# Patient Record
Sex: FEMALE | Race: OTHER | HISPANIC OR LATINO | Employment: FULL TIME | URBAN - METROPOLITAN AREA
[De-identification: names, ages, dates, MRNs, and addresses within clinical notes are randomized per-mention and may not be internally consistent; named-entity substitution may affect disease eponyms.]

---

## 2019-10-24 PROBLEM — M72.2 PLANTAR FASCIITIS: Status: ACTIVE | Noted: 2019-10-24

## 2019-10-24 PROBLEM — R07.9 CHEST PAIN OF UNCERTAIN ETIOLOGY: Status: ACTIVE | Noted: 2019-10-24

## 2019-10-24 PROBLEM — R76.8 CYCLIC CITRULLINATED PEPTIDE (CCP) ANTIBODY POSITIVE: Status: ACTIVE | Noted: 2019-10-24

## 2020-08-31 ENCOUNTER — HOSPITAL ENCOUNTER (OUTPATIENT)
Dept: LAB | Facility: MEDICAL CENTER | Age: 35
End: 2020-08-31
Attending: PHYSICIAN ASSISTANT
Payer: COMMERCIAL

## 2020-08-31 PROCEDURE — 87624 HPV HI-RISK TYP POOLED RSLT: CPT

## 2020-08-31 PROCEDURE — 88175 CYTOPATH C/V AUTO FLUID REDO: CPT

## 2020-09-01 LAB
CYTOLOGY REG CYTOL: NORMAL
HPV HR 12 DNA CVX QL NAA+PROBE: NEGATIVE
HPV16 DNA SPEC QL NAA+PROBE: NEGATIVE
HPV18 DNA SPEC QL NAA+PROBE: NEGATIVE
SPECIMEN SOURCE: NORMAL

## 2020-11-18 ENCOUNTER — HOSPITAL ENCOUNTER (OUTPATIENT)
Dept: RADIOLOGY | Facility: MEDICAL CENTER | Age: 35
End: 2020-11-18
Attending: PHYSICIAN ASSISTANT
Payer: COMMERCIAL

## 2020-11-18 ENCOUNTER — HOSPITAL ENCOUNTER (OUTPATIENT)
Dept: RADIOLOGY | Facility: MEDICAL CENTER | Age: 35
End: 2020-11-18
Payer: COMMERCIAL

## 2020-11-18 DIAGNOSIS — N63.15 BREAST LUMP ON RIGHT SIDE AT 3 O'CLOCK POSITION: ICD-10-CM

## 2020-11-18 DIAGNOSIS — N64.4 BREAST PAIN: ICD-10-CM

## 2020-11-18 PROCEDURE — 76642 ULTRASOUND BREAST LIMITED: CPT | Mod: RT

## 2020-11-18 PROCEDURE — G0279 TOMOSYNTHESIS, MAMMO: HCPCS

## 2020-11-20 ENCOUNTER — HOSPITAL ENCOUNTER (OUTPATIENT)
Dept: RADIOLOGY | Facility: MEDICAL CENTER | Age: 35
End: 2020-11-20
Attending: PHYSICIAN ASSISTANT
Payer: COMMERCIAL

## 2020-11-20 DIAGNOSIS — R92.8 ABNORMAL FINDING ON BREAST IMAGING: ICD-10-CM

## 2020-11-20 PROCEDURE — 19083 BX BREAST 1ST LESION US IMAG: CPT | Mod: RT

## 2020-11-20 PROCEDURE — 38505 NEEDLE BIOPSY LYMPH NODES: CPT

## 2020-11-20 PROCEDURE — 19286 PERQ DEV BREAST ADD US IMAG: CPT

## 2020-11-20 PROCEDURE — 10035 PLMT SFT TISS LOCLZJ DEV 1ST: CPT

## 2020-11-20 PROCEDURE — 88312 SPECIAL STAINS GROUP 1: CPT | Mod: 59

## 2020-11-20 PROCEDURE — A4648 IMPLANTABLE TISSUE MARKER: HCPCS | Mod: RT

## 2020-11-20 PROCEDURE — 88305 TISSUE EXAM BY PATHOLOGIST: CPT | Mod: 59

## 2020-11-20 PROCEDURE — 19285 PERQ DEV BREAST 1ST US IMAG: CPT | Mod: RT

## 2020-11-21 LAB — PATHOLOGY CONSULT NOTE: NORMAL

## 2020-11-23 ENCOUNTER — TELEPHONE (OUTPATIENT)
Dept: RADIOLOGY | Facility: MEDICAL CENTER | Age: 35
End: 2020-11-23

## 2021-03-11 ENCOUNTER — HOSPITAL ENCOUNTER (OUTPATIENT)
Dept: RADIOLOGY | Facility: MEDICAL CENTER | Age: 36
End: 2021-03-11
Attending: INTERNAL MEDICINE
Payer: COMMERCIAL

## 2021-03-11 ENCOUNTER — HOSPITAL ENCOUNTER (OUTPATIENT)
Dept: LAB | Facility: MEDICAL CENTER | Age: 36
End: 2021-03-11
Attending: INTERNAL MEDICINE
Payer: COMMERCIAL

## 2021-03-11 DIAGNOSIS — N61.21 GRANULOMATOUS MASTITIS OF RIGHT BREAST: ICD-10-CM

## 2021-03-11 DIAGNOSIS — R76.8 PR3 ANCA ANTIBODIES PRESENT: ICD-10-CM

## 2021-03-11 DIAGNOSIS — N61.20 GRANULOMATOUS MASTITIS, UNSPECIFIED LATERALITY: ICD-10-CM

## 2021-03-11 DIAGNOSIS — M05.9 SEROPOSITIVE RHEUMATOID ARTHRITIS (HCC): ICD-10-CM

## 2021-03-11 DIAGNOSIS — Z79.899 LONG-TERM USE OF PLAQUENIL: ICD-10-CM

## 2021-03-11 LAB
ALBUMIN SERPL BCP-MCNC: 4 G/DL (ref 3.2–4.9)
ALBUMIN/GLOB SERPL: 1.3 G/DL
ALP SERPL-CCNC: 88 U/L (ref 30–99)
ALT SERPL-CCNC: 24 U/L (ref 2–50)
ANION GAP SERPL CALC-SCNC: 11 MMOL/L (ref 7–16)
APPEARANCE UR: CLEAR
AST SERPL-CCNC: 23 U/L (ref 12–45)
BASOPHILS # BLD AUTO: 0.6 % (ref 0–1.8)
BASOPHILS # BLD: 0.04 K/UL (ref 0–0.12)
BILIRUB SERPL-MCNC: 0.3 MG/DL (ref 0.1–1.5)
BILIRUB UR QL STRIP.AUTO: NEGATIVE
BUN SERPL-MCNC: 11 MG/DL (ref 8–22)
CALCIUM SERPL-MCNC: 9.4 MG/DL (ref 8.5–10.5)
CHLORIDE SERPL-SCNC: 102 MMOL/L (ref 96–112)
CO2 SERPL-SCNC: 23 MMOL/L (ref 20–33)
COLOR UR: YELLOW
CREAT SERPL-MCNC: 0.52 MG/DL (ref 0.5–1.4)
CREAT UR-MCNC: 136.66 MG/DL
CRP SERPL HS-MCNC: 0.29 MG/DL (ref 0–0.75)
EOSINOPHIL # BLD AUTO: 0.22 K/UL (ref 0–0.51)
EOSINOPHIL NFR BLD: 3.1 % (ref 0–6.9)
ERYTHROCYTE [DISTWIDTH] IN BLOOD BY AUTOMATED COUNT: 39.7 FL (ref 35.9–50)
ERYTHROCYTE [SEDIMENTATION RATE] IN BLOOD BY WESTERGREN METHOD: 7 MM/HOUR (ref 0–20)
GLOBULIN SER CALC-MCNC: 3.2 G/DL (ref 1.9–3.5)
GLUCOSE SERPL-MCNC: 75 MG/DL (ref 65–99)
GLUCOSE UR STRIP.AUTO-MCNC: NEGATIVE MG/DL
HCT VFR BLD AUTO: 38.9 % (ref 37–47)
HGB BLD-MCNC: 11.9 G/DL (ref 12–16)
IMM GRANULOCYTES # BLD AUTO: 0.03 K/UL (ref 0–0.11)
IMM GRANULOCYTES NFR BLD AUTO: 0.4 % (ref 0–0.9)
KETONES UR STRIP.AUTO-MCNC: NEGATIVE MG/DL
LEUKOCYTE ESTERASE UR QL STRIP.AUTO: NEGATIVE
LYMPHOCYTES # BLD AUTO: 2.19 K/UL (ref 1–4.8)
LYMPHOCYTES NFR BLD: 30.8 % (ref 22–41)
MCH RBC QN AUTO: 24.4 PG (ref 27–33)
MCHC RBC AUTO-ENTMCNC: 30.6 G/DL (ref 33.6–35)
MCV RBC AUTO: 79.7 FL (ref 81.4–97.8)
MICRO URNS: NORMAL
MONOCYTES # BLD AUTO: 0.78 K/UL (ref 0–0.85)
MONOCYTES NFR BLD AUTO: 11 % (ref 0–13.4)
NEUTROPHILS # BLD AUTO: 3.85 K/UL (ref 2–7.15)
NEUTROPHILS NFR BLD: 54.1 % (ref 44–72)
NITRITE UR QL STRIP.AUTO: NEGATIVE
NRBC # BLD AUTO: 0 K/UL
NRBC BLD-RTO: 0 /100 WBC
PH UR STRIP.AUTO: 6.5 [PH] (ref 5–8)
PLATELET # BLD AUTO: 203 K/UL (ref 164–446)
PMV BLD AUTO: 13.6 FL (ref 9–12.9)
POTASSIUM SERPL-SCNC: 4.2 MMOL/L (ref 3.6–5.5)
PROT SERPL-MCNC: 7.2 G/DL (ref 6–8.2)
PROT UR QL STRIP: NEGATIVE MG/DL
PROT UR-MCNC: 8 MG/DL (ref 0–15)
PROT/CREAT UR: 59 MG/G (ref 10–107)
RBC # BLD AUTO: 4.88 M/UL (ref 4.2–5.4)
RBC UR QL AUTO: NEGATIVE
SODIUM SERPL-SCNC: 136 MMOL/L (ref 135–145)
SP GR UR STRIP.AUTO: 1.02
UROBILINOGEN UR STRIP.AUTO-MCNC: 0.2 MG/DL
WBC # BLD AUTO: 7.1 K/UL (ref 4.8–10.8)

## 2021-03-11 PROCEDURE — 86140 C-REACTIVE PROTEIN: CPT

## 2021-03-11 PROCEDURE — 81003 URINALYSIS AUTO W/O SCOPE: CPT

## 2021-03-11 PROCEDURE — 36415 COLL VENOUS BLD VENIPUNCTURE: CPT

## 2021-03-11 PROCEDURE — 70220 X-RAY EXAM OF SINUSES: CPT

## 2021-03-11 PROCEDURE — 85025 COMPLETE CBC W/AUTO DIFF WBC: CPT

## 2021-03-11 PROCEDURE — 80053 COMPREHEN METABOLIC PANEL: CPT

## 2021-03-11 PROCEDURE — 84156 ASSAY OF PROTEIN URINE: CPT

## 2021-03-11 PROCEDURE — 85652 RBC SED RATE AUTOMATED: CPT

## 2021-03-11 PROCEDURE — 71046 X-RAY EXAM CHEST 2 VIEWS: CPT

## 2021-03-11 PROCEDURE — 86255 FLUORESCENT ANTIBODY SCREEN: CPT

## 2021-03-11 PROCEDURE — 82570 ASSAY OF URINE CREATININE: CPT

## 2021-03-15 LAB — ANCA IGG TITR SER IF: NORMAL {TITER}

## 2021-11-17 ENCOUNTER — OFFICE VISIT (OUTPATIENT)
Dept: MEDICAL GROUP | Facility: CLINIC | Age: 36
End: 2021-11-17
Payer: COMMERCIAL

## 2021-11-17 VITALS
SYSTOLIC BLOOD PRESSURE: 121 MMHG | DIASTOLIC BLOOD PRESSURE: 64 MMHG | RESPIRATION RATE: 16 BRPM | WEIGHT: 210.4 LBS | OXYGEN SATURATION: 97 % | TEMPERATURE: 97.9 F | HEIGHT: 62 IN | BODY MASS INDEX: 38.72 KG/M2 | HEART RATE: 90 BPM

## 2021-11-17 DIAGNOSIS — Z00.00 ENCOUNTER FOR PREVENTATIVE ADULT HEALTH CARE EXAMINATION: ICD-10-CM

## 2021-11-17 PROCEDURE — 99395 PREV VISIT EST AGE 18-39: CPT | Performed by: FAMILY MEDICINE

## 2021-11-17 RX ORDER — M-VIT,TX,IRON,MINS/CALC/FOLIC 27MG-0.4MG
1 TABLET ORAL DAILY
COMMUNITY
End: 2021-12-01

## 2021-11-17 ASSESSMENT — PATIENT HEALTH QUESTIONNAIRE - PHQ9: CLINICAL INTERPRETATION OF PHQ2 SCORE: 0

## 2021-11-17 ASSESSMENT — FIBROSIS 4 INDEX: FIB4 SCORE: 0.86

## 2021-11-18 PROBLEM — Z00.00 ENCOUNTER FOR PREVENTATIVE ADULT HEALTH CARE EXAMINATION: Status: ACTIVE | Noted: 2021-11-18

## 2021-11-18 NOTE — PROGRESS NOTES
CC:There were no encounter diagnoses.      HISTORY OF PRESENT ILLNESS: Patient is a 36 y.o. female established patient who presents today for annual visit. No acute concerns.     Health Maintenance: Completed  Pap 2019 normal.       Encounter for preventative adult health care examination  UTD on routine preventive care. Declines covid vaccination--discussed strong recommendation for it ad questions answered.     BMI 38.0-38.9,adult  Discussed at some length. Pt would like to lose weight. We discussed TLC including swapping processed foods for vegetables and walking most every day. She will follow up if she would like more assistance with weight loss; might be a good candidate for ozempic.      Patient Active Problem List    Diagnosis Date Noted   • Cyclic citrullinated peptide (CCP) antibody positive 10/24/2019   • Chest pain of uncertain etiology 10/24/2019   • Plantar fasciitis 10/24/2019      Allergies:Penicillins    Current Outpatient Medications   Medication Sig Dispense Refill   • hydroxychloroquine (PLAQUENIL) 200 MG Tab Take 2 Tablets by mouth every day. 28 tablet 0   • Cholecalciferol (D-1000) 1000 UNIT Tab D-1000 1000 UNIT TABS     • naproxen (NAPROSYN) 500 MG Tab Take 1 Tab by mouth 2 times a day, with meals. Take with food, avoid use with other NSAIDs (Patient not taking: Reported on 11/17/2021) 180 Tab 1     No current facility-administered medications for this visit.       Social History     Tobacco Use   • Smoking status: Never Smoker   • Smokeless tobacco: Never Used   Vaping Use   • Vaping Use: Never used   Substance Use Topics   • Alcohol use: Yes     Comment: rarely   • Drug use: Never     Social History     Social History Narrative   • Not on file       Family History   Problem Relation Age of Onset   • Lupus Mother    • Thyroid Father        Exam:    /64 (BP Location: Left arm, Patient Position: Sitting, BP Cuff Size: Adult)   Pulse 90   Temp 36.6 °C (97.9 °F) (Temporal)   Resp 16    "Ht 1.575 m (5' 2\")   Wt 95.4 kg (210 lb 6.4 oz)   SpO2 97%  Body mass index is 38.48 kg/m².    General:  Well nourished, well developed female in NAD  HENT: Atraumatic, normocephalic  EYES: Extraocular movements intact, pupils equal and reactive to light  NECK: Supple, FROM  CHEST: No deformities, Equal chest expansion  RESP: Unlabored, no stridor or audible wheeze  ABD: Non-Distended  Extremities: No Clubbing, Cyanosis, or Edema  Skin: Warm/dry, without rashes  Neuro: A/O x 4, CN 2-12 Grossly intact, Motor/sensory grossly intact  Psych: Normal behavior, normal affect      Return in about 3 months (around 2/17/2022).    My total time spent caring for the patient on the day of the encounter was 30 minutes.   This does not include time spent on separately billable procedures/tests.     Please note that this dictation was created using voice recognition software. I have worked with consultants from the vendor as well as technical experts from [facility] to optimize the interface. I have made every reasonable attempt to correct obvious errors, but I expect that there are errors of grammar and possibly content that I did not discover before finalizing the note.  "

## 2021-11-18 NOTE — ASSESSMENT & PLAN NOTE
Discussed at some length. Pt would like to lose weight. We discussed TLC including swapping processed foods for vegetables and walking most every day. She will follow up if she would like more assistance with weight loss; might be a good candidate for ozempic.

## 2021-11-18 NOTE — ASSESSMENT & PLAN NOTE
UTD on routine preventive care. Declines covid vaccination--discussed strong recommendation for it ad questions answered.

## 2022-11-22 ENCOUNTER — OFFICE VISIT (OUTPATIENT)
Dept: MEDICAL GROUP | Facility: CLINIC | Age: 37
End: 2022-11-22
Payer: COMMERCIAL

## 2022-11-22 VITALS
RESPIRATION RATE: 17 BRPM | BODY MASS INDEX: 39.56 KG/M2 | HEART RATE: 76 BPM | SYSTOLIC BLOOD PRESSURE: 119 MMHG | OXYGEN SATURATION: 98 % | HEIGHT: 62 IN | DIASTOLIC BLOOD PRESSURE: 80 MMHG | WEIGHT: 215 LBS

## 2022-11-22 DIAGNOSIS — Z00.00 ENCOUNTER FOR PREVENTATIVE ADULT HEALTH CARE EXAMINATION: ICD-10-CM

## 2022-11-22 DIAGNOSIS — Z11.59 NEED FOR HEPATITIS C SCREENING TEST: ICD-10-CM

## 2022-11-22 DIAGNOSIS — E66.9 OBESITY (BMI 30-39.9): ICD-10-CM

## 2022-11-22 DIAGNOSIS — Z31.69 ENCOUNTER FOR PRECONCEPTION CONSULTATION: ICD-10-CM

## 2022-11-22 PROBLEM — M06.9 RHEUMATOID ARTHRITIS (HCC): Status: ACTIVE | Noted: 2020-08-25

## 2022-11-22 PROBLEM — E78.5 DYSLIPIDEMIA: Status: ACTIVE | Noted: 2019-07-02

## 2022-11-22 PROCEDURE — 99395 PREV VISIT EST AGE 18-39: CPT | Performed by: FAMILY MEDICINE

## 2022-11-22 ASSESSMENT — PATIENT HEALTH QUESTIONNAIRE - PHQ9: CLINICAL INTERPRETATION OF PHQ2 SCORE: 0

## 2022-11-22 ASSESSMENT — FIBROSIS 4 INDEX: FIB4 SCORE: 0.88

## 2022-11-22 NOTE — ASSESSMENT & PLAN NOTE
We discussed BMI of 39 and associated health risks. She is interested in using TLC for weight loss. Declined medication assistance at this time. Will check metabolic labs.

## 2022-11-22 NOTE — PROGRESS NOTES
CC:Diagnoses of Obesity (BMI 30-39.9), Need for hepatitis C screening test, and Encounter for preconception consultation were pertinent to this visit.      HISTORY OF PRESENT ILLNESS: Patient is a 37 y.o. female established patient who presents today to discuss the following:        Obesity (BMI 30-39.9)  We discussed BMI of 39 and associated health risks. She is interested in using TLC for weight loss. Declined medication assistance at this time. Will check metabolic labs.     Encounter for preconception consultation  . Interested in becoming pregnant within the next year. She is taking a prenatal vitamin daily. Will use sperm donor. Not currently on contraceptive, has female partner. History of preeclampsia at the end of last pregnancy at term, no history of GDM. We discussed general health including weight as important to consider for healthy pregnancy. No alcohol or tobacco or vape use.     Encounter for preventative adult health care examination  UTD on recommended preventive screenings; declines vaccines at this time but will think about getting the hep B series started.   Sees eye dr and dentist regularly.     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      hydroxychloroquine (PLAQUENIL) 200 MG Tab TAKE 2 TABLETS DAILY 180 Tablet 0    Vitamin D, Cholecalciferol, 50 MCG ( UT) Cap Take 50 mcg by mouth.       "ELDERBERRY PO Take 3,800 mg by mouth.      Zinc 50 MG Cap Take 50 mg by mouth every day.      Bioflavonoid Products (HALEY C PO) Take 1,000 mg by mouth.       No current facility-administered medications for this visit.       Social History     Tobacco Use    Smoking status: Never    Smokeless tobacco: Never   Vaping Use    Vaping Use: Never used   Substance Use Topics    Alcohol use: Yes     Comment: rarely    Drug use: Never     Social History     Social History Narrative    Not on file       Family History   Problem Relation Age of Onset    Lupus Mother     Thyroid Father        Exam:    /80 (BP Location: Left arm, Patient Position: Sitting, BP Cuff Size: Adult)   Pulse 76   Resp 17   Ht 1.575 m (5' 2\")   Wt 97.5 kg (215 lb)   SpO2 98%  Body mass index is 39.32 kg/m².    General:  Well nourished, well developed female in NAD  HENT: Atraumatic, normocephalic  EYES: Extraocular movements intact, pupils equal and reactive to light  NECK: Supple, FROM  CHEST: No deformities, Equal chest expansion  RESP: Unlabored, no stridor or audible wheeze  ABD: Non-Distended  Extremities: No Clubbing, Cyanosis, or Edema  Skin: Warm/dry, without rashes  Neuro: A/O x 4, CN 2-12 Grossly intact, Motor/sensory grossly intact  Psych: Normal behavior, normal affect      LABS: new metabolic labs ordered.         Return in about 1 year (around 11/22/2023).    My total time spent caring for the patient on the day of the encounter was 30 minutes.   This does not include time spent on separately billable procedures/tests.     "

## 2022-11-22 NOTE — ASSESSMENT & PLAN NOTE
. Interested in becoming pregnant within the next year. She is taking a prenatal vitamin daily. Will use sperm donor. Not currently on contraceptive, has female partner. History of preeclampsia at the end of last pregnancy at term, no history of GDM. We discussed general health including weight as important to consider for healthy pregnancy. No alcohol or tobacco or vape use.

## 2022-11-22 NOTE — ASSESSMENT & PLAN NOTE
UTD on recommended preventive screenings; declines vaccines at this time but will think about getting the hep B series started.   Sees eye dr and dentist regularly.

## 2022-12-22 LAB — HBA1C MFR BLD: 5.2 % (ref 0–5.6)

## 2023-11-27 ENCOUNTER — OFFICE VISIT (OUTPATIENT)
Dept: MEDICAL GROUP | Facility: CLINIC | Age: 38
End: 2023-11-27
Payer: COMMERCIAL

## 2023-11-27 VITALS
SYSTOLIC BLOOD PRESSURE: 116 MMHG | HEIGHT: 62 IN | BODY MASS INDEX: 39.38 KG/M2 | OXYGEN SATURATION: 97 % | WEIGHT: 214 LBS | HEART RATE: 79 BPM | DIASTOLIC BLOOD PRESSURE: 78 MMHG | TEMPERATURE: 97.7 F

## 2023-11-27 DIAGNOSIS — Z23 NEED FOR VACCINATION: ICD-10-CM

## 2023-11-27 DIAGNOSIS — M06.9 RHEUMATOID ARTHRITIS OF HIP, UNSPECIFIED LATERALITY, UNSPECIFIED WHETHER RHEUMATOID FACTOR PRESENT (HCC): ICD-10-CM

## 2023-11-27 PROCEDURE — 90715 TDAP VACCINE 7 YRS/> IM: CPT

## 2023-11-27 PROCEDURE — 90746 HEPB VACCINE 3 DOSE ADULT IM: CPT

## 2023-11-27 PROCEDURE — 3078F DIAST BP <80 MM HG: CPT

## 2023-11-27 PROCEDURE — 3074F SYST BP LT 130 MM HG: CPT

## 2023-11-27 PROCEDURE — 90471 IMMUNIZATION ADMIN: CPT

## 2023-11-27 PROCEDURE — 90472 IMMUNIZATION ADMIN EACH ADD: CPT

## 2023-11-27 PROCEDURE — 90707 MMR VACCINE SC: CPT

## 2023-11-27 PROCEDURE — 99213 OFFICE O/P EST LOW 20 MIN: CPT | Mod: 25,GE

## 2023-11-27 ASSESSMENT — PATIENT HEALTH QUESTIONNAIRE - PHQ9: CLINICAL INTERPRETATION OF PHQ2 SCORE: 0

## 2023-11-28 NOTE — ASSESSMENT & PLAN NOTE
Chronic. Stable on Plaquenil. Followed by Rheum, Dr. De La Paz. Med compliant and was seen last month for follow up appt, no new concerns, however, patient was given prescription for Nabumetone for pain symptoms. She states this medication relieved her symptoms, as Naproxen was not helping.     Plan:  - Plaquenil as directed by rheum  - Nabumetone as directed by rheum

## 2023-11-28 NOTE — PROGRESS NOTES
"Subjective:     CC: Establish care.    HPI:   Brianne is a 39yo female who presents today to establish care with myself. Has history of RA and stable on plaquenil. Saw rheum last month with no changes to medication regiment and no new concerns to report today. We discussed satisfying care gaps today and update vaccinations records as well.    No history of smoking, alcohol use or illicit drug use.  to a female partner and has one son from a previous marriage. No concerns for STDs at this time but would like to schedule a pap smear.     Current Outpatient Medications Ordered in Epic   Medication Sig Dispense Refill    nabumetone (RELAFEN) 500 MG Tab Take 1 Tablet by mouth 2 times a day as needed for Moderate Pain. 60 Tablet 3    hydroxychloroquine (PLAQUENIL) 200 MG Tab Take 2 Tablets by mouth every day. 180 Tablet 3    Prenatal MV-Min-Fe Fum-FA-DHA (PRENATAL 1 PO) Take 1 Tablet by mouth every day.      Vitamin D, Cholecalciferol, 50 MCG (2000 UT) Cap Take 50 mcg by mouth.      ELDERBERRY PO Take 3,800 mg by mouth.      Zinc 50 MG Cap Take 50 mg by mouth every day.      Bioflavonoid Products (HALEY C PO) Take 1,000 mg by mouth.       No current Epic-ordered facility-administered medications on file.       ROS:  Gen: no fevers/chills, no changes in weight  Pulm: no sob, no cough  CV: no chest pain, no palpitations  GI: no nausea/vomiting, no diarrhea    Objective:     Exam:  /78 (BP Location: Left arm, Patient Position: Sitting, BP Cuff Size: Adult)   Pulse 79   Temp 36.5 °C (97.7 °F) (Temporal)   Ht 1.575 m (5' 2\")   Wt 97.1 kg (214 lb)   SpO2 97%   BMI 39.14 kg/m²  Body mass index is 39.14 kg/m².    Gen: Alert and oriented, No apparent distress.  Neck: Neck is supple without lymphadenopathy.  Lungs: Normal effort, CTA bilaterally, no wheezes, rhonchi, or rales  CV: Regular rate and rhythm. No murmurs, rubs, or gallops.  Ext: No clubbing, cyanosis, edema.    Labs: None reviewed today.    Assessment " & Plan:     38 y.o. female with the following:     Problem List Items Addressed This Visit       Rheumatoid arthritis (HCC)     Chronic. Stable on Plaquenil. Followed by Rheum, Dr. De La Paz. Med compliant and was seen last month for follow up appt, no new concerns, however, patient was given prescription for Nabumetone for pain symptoms. She states this medication relieved her symptoms, as Naproxen was not helping.     Plan:  - Plaquenil as directed by rheum  - Nabumetone as directed by rheum          Other Visit Diagnoses       Need for vaccination        Relevant Orders    Tdap Vaccine =>6YO IM (Completed)    MMR Vaccine SQ (Completed)    Hepatitis B Vaccine Adult 20+ (Completed)           At next follow up visit:  - Pap smear      Melanie Schaffer M.D.  PGY2 Resident  [employer], Family Medicine

## 2023-12-27 ENCOUNTER — NON-PROVIDER VISIT (OUTPATIENT)
Dept: MEDICAL GROUP | Facility: CLINIC | Age: 38
End: 2023-12-27
Payer: COMMERCIAL

## 2023-12-27 DIAGNOSIS — Z23 NEED FOR VACCINATION: ICD-10-CM

## 2023-12-27 PROCEDURE — 90746 HEPB VACCINE 3 DOSE ADULT IM: CPT | Performed by: FAMILY MEDICINE

## 2023-12-27 PROCEDURE — 90471 IMMUNIZATION ADMIN: CPT | Performed by: FAMILY MEDICINE

## 2023-12-27 NOTE — PROGRESS NOTES
"Brianne Whelan is a 38 y.o. female here for a non-provider visit for:   HEPATITIS B 2 of 3    Reason for immunization: continue or complete series started at the office  Immunization records indicate need for vaccine: Yes, confirmed with [facility]  Minimum interval has been met for this vaccine: Yes  ABN completed: No    VIS Dated  12.27.2023 was given to patient: Yes  All IAC Questionnaire questions were answered \"No.\"    Patient tolerated injection and no adverse effects were observed or reported: Yes    Pt scheduled for next dose in series: Not Indicated  "

## 2024-01-04 ENCOUNTER — APPOINTMENT (OUTPATIENT)
Dept: MEDICAL GROUP | Facility: CLINIC | Age: 39
End: 2024-01-04
Payer: COMMERCIAL

## 2024-01-22 ENCOUNTER — APPOINTMENT (OUTPATIENT)
Dept: MEDICAL GROUP | Facility: CLINIC | Age: 39
End: 2024-01-22
Payer: COMMERCIAL

## 2024-02-20 ENCOUNTER — PATIENT MESSAGE (OUTPATIENT)
Dept: MEDICAL GROUP | Facility: CLINIC | Age: 39
End: 2024-02-20
Payer: COMMERCIAL

## 2024-02-22 ENCOUNTER — HOSPITAL ENCOUNTER (OUTPATIENT)
Facility: MEDICAL CENTER | Age: 39
End: 2024-02-22
Payer: COMMERCIAL

## 2024-02-22 ENCOUNTER — OFFICE VISIT (OUTPATIENT)
Dept: MEDICAL GROUP | Facility: CLINIC | Age: 39
End: 2024-02-22
Payer: COMMERCIAL

## 2024-02-22 VITALS
OXYGEN SATURATION: 96 % | HEART RATE: 80 BPM | RESPIRATION RATE: 16 BRPM | HEIGHT: 62 IN | TEMPERATURE: 97.6 F | DIASTOLIC BLOOD PRESSURE: 70 MMHG | SYSTOLIC BLOOD PRESSURE: 120 MMHG | WEIGHT: 213 LBS | BODY MASS INDEX: 39.2 KG/M2

## 2024-02-22 DIAGNOSIS — Z12.4 PAP SMEAR FOR CERVICAL CANCER SCREENING: ICD-10-CM

## 2024-02-22 PROCEDURE — 3078F DIAST BP <80 MM HG: CPT

## 2024-02-22 PROCEDURE — 87624 HPV HI-RISK TYP POOLED RSLT: CPT

## 2024-02-22 PROCEDURE — 88175 CYTOPATH C/V AUTO FLUID REDO: CPT

## 2024-02-22 PROCEDURE — 3074F SYST BP LT 130 MM HG: CPT

## 2024-02-22 PROCEDURE — 99999 PR NO CHARGE: CPT | Mod: GE

## 2024-02-23 NOTE — PROGRESS NOTES
"Subjective:     CC: Cervical cancer screening pap smear.    HPI:   Brianne is a 40 yo female who is here for cervical cancer screening pap smear. Denies abnormal vaginal bleeding, cervical/abdominal or vaginal pain, no abnormal masses palpated. Most recent pap smear 3 years ago, and within normal limits. No history of STIs. Currently in monogamous relationship with a female.    Current Outpatient Medications Ordered in Epic   Medication Sig Dispense Refill    nabumetone (RELAFEN) 500 MG Tab Take 1 Tablet by mouth 2 times a day as needed for Moderate Pain. 60 Tablet 3    hydroxychloroquine (PLAQUENIL) 200 MG Tab Take 2 Tablets by mouth every day. 180 Tablet 3    Prenatal MV-Min-Fe Fum-FA-DHA (PRENATAL 1 PO) Take 1 Tablet by mouth every day.      Vitamin D, Cholecalciferol, 50 MCG (2000 UT) Cap Take 50 mcg by mouth.      ELDERBERRY PO Take 3,800 mg by mouth.      Zinc 50 MG Cap Take 50 mg by mouth every day.      Bioflavonoid Products (HALEY C PO) Take 1,000 mg by mouth.       No current Epic-ordered facility-administered medications on file.     ROS:  Gen: no fevers/chills, no changes in weight  Pulm: no sob, no cough  CV: no chest pain, no palpitations  GI: no nausea/vomiting, no diarrhea    Objective:     Exam:  /70 (BP Location: Left arm, Patient Position: Sitting, BP Cuff Size: Adult)   Pulse 80   Temp 36.4 °C (97.6 °F) (Temporal)   Resp 16   Ht 1.575 m (5' 2\")   Wt 96.6 kg (213 lb)   SpO2 96%   BMI 38.96 kg/m²  Body mass index is 38.96 kg/m².    Gen: Alert and oriented, No apparent distress.  Neck: Neck is supple without lymphadenopathy.  Lungs: Normal effort, CTA bilaterally, no wheezes, rhonchi, or rales  CV: Regular rate and rhythm. No murmurs, rubs, or gallops.  Ext: No clubbing, cyanosis, edema.    Labs: no labs ordered or reviewed today.  Assessment & Plan:     39 y.o. female with the following:     Problem List Items Addressed This Visit    None  Visit Diagnoses       Pap smear for cervical " cancer screening        Relevant Orders    Pap +  HPV + Reflex Genotyping 16/18/45           At next follow up visit:  -Review pap results.    Melanie Schaffer M.D.  PGY2 Resident  [employer], Family Medicine

## 2024-02-27 LAB
CYTOLOGIST CVX/VAG CYTO: NORMAL
CYTOLOGY CVX/VAG DOC CYTO: NORMAL
CYTOLOGY CVX/VAG DOC THIN PREP: NORMAL
HPV I/H RISK 4 DNA CVX QL PROBE+SIG AMP: NEGATIVE
NOTE NL11727A: NORMAL
OTHER STN SPEC: NORMAL
STAT OF ADQ CVX/VAG CYTO-IMP: NORMAL